# Patient Record
Sex: FEMALE | Race: WHITE
[De-identification: names, ages, dates, MRNs, and addresses within clinical notes are randomized per-mention and may not be internally consistent; named-entity substitution may affect disease eponyms.]

---

## 2019-03-01 ENCOUNTER — HOSPITAL ENCOUNTER (OUTPATIENT)
Dept: HOSPITAL 61 - PCVCCLINIC | Age: 79
Discharge: HOME | End: 2019-03-01
Attending: INTERNAL MEDICINE
Payer: MEDICARE

## 2019-03-01 DIAGNOSIS — Z88.8: ICD-10-CM

## 2019-03-01 DIAGNOSIS — I48.1: Primary | ICD-10-CM

## 2019-03-01 DIAGNOSIS — K21.9: ICD-10-CM

## 2019-03-01 DIAGNOSIS — Z79.899: ICD-10-CM

## 2019-03-01 DIAGNOSIS — E78.5: ICD-10-CM

## 2019-03-01 DIAGNOSIS — I50.32: ICD-10-CM

## 2019-03-01 DIAGNOSIS — I11.0: ICD-10-CM

## 2019-03-01 DIAGNOSIS — R94.31: ICD-10-CM

## 2019-03-01 DIAGNOSIS — M19.90: ICD-10-CM

## 2019-03-01 PROCEDURE — 93005 ELECTROCARDIOGRAM TRACING: CPT

## 2019-03-01 PROCEDURE — 36415 COLL VENOUS BLD VENIPUNCTURE: CPT

## 2019-03-01 PROCEDURE — G0463 HOSPITAL OUTPT CLINIC VISIT: HCPCS

## 2019-03-01 PROCEDURE — 80061 LIPID PANEL: CPT

## 2019-04-12 ENCOUNTER — HOSPITAL ENCOUNTER (OUTPATIENT)
Dept: HOSPITAL 61 - PCVCCLINIC | Age: 79
Discharge: HOME | End: 2019-04-12
Attending: INTERNAL MEDICINE
Payer: MEDICARE

## 2019-04-12 DIAGNOSIS — Z88.2: ICD-10-CM

## 2019-04-12 DIAGNOSIS — Z88.1: ICD-10-CM

## 2019-04-12 DIAGNOSIS — I48.1: Primary | ICD-10-CM

## 2019-04-12 DIAGNOSIS — I11.0: ICD-10-CM

## 2019-04-12 DIAGNOSIS — Z88.8: ICD-10-CM

## 2019-04-12 DIAGNOSIS — E78.5: ICD-10-CM

## 2019-04-12 DIAGNOSIS — I50.32: ICD-10-CM

## 2019-04-12 PROCEDURE — 93005 ELECTROCARDIOGRAM TRACING: CPT

## 2019-04-12 PROCEDURE — G0463 HOSPITAL OUTPT CLINIC VISIT: HCPCS

## 2019-06-12 ENCOUNTER — HOSPITAL ENCOUNTER (OUTPATIENT)
Dept: HOSPITAL 61 - PCVCIMAG | Age: 79
Discharge: HOME | End: 2019-06-12
Attending: INTERNAL MEDICINE
Payer: MEDICARE

## 2019-06-12 DIAGNOSIS — I48.1: ICD-10-CM

## 2019-06-12 DIAGNOSIS — I08.1: Primary | ICD-10-CM

## 2019-06-12 DIAGNOSIS — R00.2: ICD-10-CM

## 2019-06-12 DIAGNOSIS — E78.5: ICD-10-CM

## 2019-06-12 DIAGNOSIS — I10: ICD-10-CM

## 2019-06-12 PROCEDURE — 93306 TTE W/DOPPLER COMPLETE: CPT

## 2019-06-12 NOTE — PCVCIMAG
--------------- APPROVED REPORT --------------





Study performed:  2019 15:11:57



EXAM: Comprehensive 2D, Doppler, and color-flow 

Echocardiogram

Patient Location: Echo lab

Status:  routine



BSA:         1.88

HR: 107 bpmBP:          160/100 mmHg

Rhythm: Atrial Fibrillation



Other Information 

Study Quality: Technically Difficult



Risk Factors: 

Cardiac Risk Factors:  HTN, Hyperlipidemia



Indications

Atrial Fibrillation

Hypertension/HDD

Hyperlipidemia



2D Dimensions

IVSd:  12.66 (7-11mm)LVOT Diam:  19.69 (18-24mm) 

LVDd:  46.06 mm

PWd:  9.07 (7-11mm)Ascending Ao:  32.98 (22-36mm)

LVDs:  27.88 (25-40mm)

Left Atrium:  57.45 (27-40mm)

Aortic Root:  30.09 mm

LV Single Plane 4CH:  45.59 %



Volumes

Left Atrial Volume (Systole)

Single Plane 4CH:  79.13 mLSingle Plane 2CH:  54.87 mL

LA ESV Index:  44.00 mL/m2



Aortic Valve

AoV Peak Tj.:  1.55 m/s

AO Peak Gr.:  10.82 mmHgLVOT Max P.16 mmHg

LVOT Max V:  1.13 m/s

SHAHEED Vmax: 2.22 cm2



Mitral Valve

E/A Ratio:  1.0

MV E Max Tj.:  1.40 m/s

MV A Tj.:  1.43 m/s



Pulmonary Valve

PV Peak Gr.:  2.52 mmHg



Tricuspid Valve

TR Peak Tj.:  2.37 m/s

TR Peak Gr.:  22.63 mmHg



Left Ventricle

The left ventricle is normal size. There is normal LV segmental wall 

motion. There is normal left ventricular wall thickness. Left 

ventricular systolic function is normal. The left ventricular 

ejection fraction is within the normal range. LVEF is 55-60%. This 

study is not technically sufficient to allow evaluation of the LV 

diastolic function due to atrial fibrillation.



Right Ventricle

The right ventricle is normal size. The right ventricular systolic 

function is normal.



Atria

Left atrium is moderately dilated. Right atrium is moderately 

dilated.



Aortic Valve

The aortic valve is normal in structure. No aortic regurgitation is 

present. There is no aortic valvular stenosis.



Mitral Valve

The mitral valve is normal in structure. Mild mitral regurgitation. 

No evidence of mitral valve stenosis.



Tricuspid Valve

The tricuspid valve is normal in structure. Moderate tricuspid 

regurgitation. Pulmonary artery pressuer is 30mmHg.



Pulmonic Valve

The pulmonary valve is normal in structure. There is no pulmonic 

valvular regurgitation.



Great Vessels

The aortic root is normal in size. IVC is normal in size and 

collapses >50% with inspiration.



Pericardium

There is no pericardial effusion.



<Conclusion>

Left ventricular systolic function is normal.

There is normal LV segmental wall motion.

LVEF is 55-60%.

Both atria are moderately dilated.

The aortic valve is normal in structure. No aortic regurgitation or 

stenosis

The mitral valve is normal in structure. Mild mitral 

regurgitation.

Moderate tricuspid regurgitation. Pulmonary artery pressuer of 

30mmHg.

There is no pericardial effusion.

## 2019-06-21 ENCOUNTER — HOSPITAL ENCOUNTER (OUTPATIENT)
Dept: HOSPITAL 61 - PCVCCLINIC | Age: 79
Discharge: HOME | End: 2019-06-21
Attending: INTERNAL MEDICINE
Payer: MEDICARE

## 2019-06-21 DIAGNOSIS — I50.32: ICD-10-CM

## 2019-06-21 DIAGNOSIS — M19.90: ICD-10-CM

## 2019-06-21 DIAGNOSIS — K21.9: ICD-10-CM

## 2019-06-21 DIAGNOSIS — I11.0: ICD-10-CM

## 2019-06-21 DIAGNOSIS — Z79.899: ICD-10-CM

## 2019-06-21 DIAGNOSIS — I48.1: Primary | ICD-10-CM

## 2019-06-21 DIAGNOSIS — E78.5: ICD-10-CM

## 2019-06-21 PROCEDURE — G0463 HOSPITAL OUTPT CLINIC VISIT: HCPCS

## 2019-06-21 PROCEDURE — 93005 ELECTROCARDIOGRAM TRACING: CPT

## 2019-06-21 PROCEDURE — 36415 COLL VENOUS BLD VENIPUNCTURE: CPT

## 2019-06-21 PROCEDURE — 80061 LIPID PANEL: CPT

## 2019-06-28 ENCOUNTER — HOSPITAL ENCOUNTER (OUTPATIENT)
Dept: HOSPITAL 35 - HYPER | Age: 79
End: 2019-06-28
Attending: PREVENTIVE MEDICINE
Payer: COMMERCIAL

## 2019-06-28 DIAGNOSIS — R60.9: ICD-10-CM

## 2019-06-28 DIAGNOSIS — L97.811: ICD-10-CM

## 2019-06-28 DIAGNOSIS — Z86.12: ICD-10-CM

## 2019-06-28 DIAGNOSIS — E78.5: ICD-10-CM

## 2019-06-28 DIAGNOSIS — L97.821: Primary | ICD-10-CM

## 2019-06-28 DIAGNOSIS — M19.90: ICD-10-CM

## 2019-06-28 DIAGNOSIS — Z79.01: ICD-10-CM

## 2019-06-28 DIAGNOSIS — K21.9: ICD-10-CM

## 2019-06-28 DIAGNOSIS — I48.1: ICD-10-CM

## 2019-06-28 DIAGNOSIS — Z90.710: ICD-10-CM

## 2019-06-28 DIAGNOSIS — I50.32: ICD-10-CM

## 2019-06-28 DIAGNOSIS — I11.0: ICD-10-CM

## 2019-07-22 ENCOUNTER — HOSPITAL ENCOUNTER (OUTPATIENT)
Dept: HOSPITAL 35 - HYPER | Age: 79
End: 2019-07-22
Attending: PREVENTIVE MEDICINE
Payer: COMMERCIAL

## 2019-07-22 DIAGNOSIS — R60.9: ICD-10-CM

## 2019-07-22 DIAGNOSIS — L97.811: ICD-10-CM

## 2019-07-22 DIAGNOSIS — M19.90: ICD-10-CM

## 2019-07-22 DIAGNOSIS — I48.1: ICD-10-CM

## 2019-07-22 DIAGNOSIS — I11.0: ICD-10-CM

## 2019-07-22 DIAGNOSIS — I48.0: ICD-10-CM

## 2019-07-22 DIAGNOSIS — K21.9: ICD-10-CM

## 2019-07-22 DIAGNOSIS — L97.821: Primary | ICD-10-CM

## 2019-07-22 DIAGNOSIS — Z79.01: ICD-10-CM

## 2019-07-22 DIAGNOSIS — E78.5: ICD-10-CM

## 2019-07-22 DIAGNOSIS — I50.32: ICD-10-CM

## 2019-08-20 ENCOUNTER — HOSPITAL ENCOUNTER (OUTPATIENT)
Dept: HOSPITAL 35 - HYPER | Age: 79
End: 2019-08-20
Attending: PREVENTIVE MEDICINE
Payer: COMMERCIAL

## 2019-08-20 DIAGNOSIS — R60.9: ICD-10-CM

## 2019-08-20 DIAGNOSIS — Z79.01: ICD-10-CM

## 2019-08-20 DIAGNOSIS — L97.821: ICD-10-CM

## 2019-08-20 DIAGNOSIS — K21.9: ICD-10-CM

## 2019-08-20 DIAGNOSIS — I50.32: ICD-10-CM

## 2019-08-20 DIAGNOSIS — E78.5: ICD-10-CM

## 2019-08-20 DIAGNOSIS — I48.1: ICD-10-CM

## 2019-08-20 DIAGNOSIS — M19.90: ICD-10-CM

## 2019-08-20 DIAGNOSIS — L97.811: Primary | ICD-10-CM

## 2019-08-20 DIAGNOSIS — I11.0: ICD-10-CM

## 2019-09-04 ENCOUNTER — HOSPITAL ENCOUNTER (OUTPATIENT)
Dept: HOSPITAL 35 - HYPER | Age: 79
End: 2019-09-04
Attending: PREVENTIVE MEDICINE
Payer: COMMERCIAL
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
## 2019-09-04 DIAGNOSIS — I11.0: ICD-10-CM

## 2019-09-04 DIAGNOSIS — K21.9: ICD-10-CM

## 2019-09-04 DIAGNOSIS — I50.32: ICD-10-CM

## 2019-09-04 DIAGNOSIS — M19.90: ICD-10-CM

## 2019-09-04 DIAGNOSIS — R60.9: ICD-10-CM

## 2019-09-04 DIAGNOSIS — E78.5: ICD-10-CM

## 2019-09-04 DIAGNOSIS — Z79.01: ICD-10-CM

## 2019-09-04 DIAGNOSIS — I48.1: ICD-10-CM

## 2019-09-04 DIAGNOSIS — L97.811: Primary | ICD-10-CM

## 2019-09-04 DIAGNOSIS — S80.822S: ICD-10-CM

## 2019-09-17 ENCOUNTER — HOSPITAL ENCOUNTER (OUTPATIENT)
Dept: HOSPITAL 35 - HYPER | Age: 79
End: 2019-09-17
Attending: PREVENTIVE MEDICINE
Payer: COMMERCIAL

## 2019-09-17 DIAGNOSIS — I50.32: ICD-10-CM

## 2019-09-17 DIAGNOSIS — E78.5: ICD-10-CM

## 2019-09-17 DIAGNOSIS — S80.822D: ICD-10-CM

## 2019-09-17 DIAGNOSIS — Q82.0: ICD-10-CM

## 2019-09-17 DIAGNOSIS — L97.811: Primary | ICD-10-CM

## 2019-09-17 DIAGNOSIS — L29.8: ICD-10-CM

## 2019-09-17 DIAGNOSIS — I11.0: ICD-10-CM

## 2019-09-17 DIAGNOSIS — Z79.01: ICD-10-CM
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
## 2019-09-17 DIAGNOSIS — K21.9: ICD-10-CM

## 2019-09-17 DIAGNOSIS — M19.90: ICD-10-CM

## 2019-09-17 DIAGNOSIS — I48.1: ICD-10-CM

## 2019-10-07 ENCOUNTER — HOSPITAL ENCOUNTER (OUTPATIENT)
Dept: HOSPITAL 35 - HYPER | Age: 79
End: 2019-10-07
Attending: PREVENTIVE MEDICINE
Payer: COMMERCIAL

## 2019-10-07 DIAGNOSIS — I11.0: ICD-10-CM

## 2019-10-07 DIAGNOSIS — L97.811: Primary | ICD-10-CM

## 2019-10-07 DIAGNOSIS — I50.32: ICD-10-CM
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
## 2019-10-07 DIAGNOSIS — Z79.01: ICD-10-CM

## 2019-10-07 DIAGNOSIS — I89.0: ICD-10-CM

## 2019-10-07 DIAGNOSIS — K21.9: ICD-10-CM

## 2019-10-07 DIAGNOSIS — E78.5: ICD-10-CM

## 2019-10-07 DIAGNOSIS — L29.8: ICD-10-CM

## 2019-10-07 DIAGNOSIS — S80.822D: ICD-10-CM

## 2019-10-07 DIAGNOSIS — M19.90: ICD-10-CM

## 2019-10-21 ENCOUNTER — HOSPITAL ENCOUNTER (OUTPATIENT)
Dept: HOSPITAL 35 - HYPER | Age: 79
End: 2019-10-21
Attending: PREVENTIVE MEDICINE
Payer: COMMERCIAL

## 2019-10-21 DIAGNOSIS — L29.8: ICD-10-CM

## 2019-10-21 DIAGNOSIS — I50.32: ICD-10-CM

## 2019-10-21 DIAGNOSIS — E78.5: ICD-10-CM

## 2019-10-21 DIAGNOSIS — S80.822D: ICD-10-CM

## 2019-10-21 DIAGNOSIS — I48.0: ICD-10-CM

## 2019-10-21 DIAGNOSIS — R60.9: ICD-10-CM

## 2019-10-21 DIAGNOSIS — I89.0: ICD-10-CM

## 2019-10-21 DIAGNOSIS — M19.90: ICD-10-CM

## 2019-10-21 DIAGNOSIS — I11.0: ICD-10-CM

## 2019-10-21 DIAGNOSIS — K21.9: ICD-10-CM

## 2019-10-21 DIAGNOSIS — L97.811: Primary | ICD-10-CM

## 2019-10-21 DIAGNOSIS — Z79.01: ICD-10-CM
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
## 2019-11-04 ENCOUNTER — HOSPITAL ENCOUNTER (OUTPATIENT)
Dept: HOSPITAL 35 - HYPER | Age: 79
End: 2019-11-04
Attending: EMERGENCY MEDICINE
Payer: COMMERCIAL
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
## 2019-11-04 DIAGNOSIS — L97.811: Primary | ICD-10-CM

## 2019-11-04 DIAGNOSIS — K21.9: ICD-10-CM

## 2019-11-04 DIAGNOSIS — I50.32: ICD-10-CM

## 2019-11-04 DIAGNOSIS — I48.19: ICD-10-CM

## 2019-11-04 DIAGNOSIS — L29.8: ICD-10-CM

## 2019-11-04 DIAGNOSIS — I11.0: ICD-10-CM

## 2019-11-04 DIAGNOSIS — M19.90: ICD-10-CM

## 2019-11-04 DIAGNOSIS — S80.822D: ICD-10-CM

## 2019-11-04 DIAGNOSIS — E78.5: ICD-10-CM

## 2019-11-04 DIAGNOSIS — L03.115: ICD-10-CM

## 2019-11-04 DIAGNOSIS — Q82.0: ICD-10-CM

## 2019-11-04 DIAGNOSIS — Z79.01: ICD-10-CM

## 2019-11-18 ENCOUNTER — HOSPITAL ENCOUNTER (OUTPATIENT)
Dept: HOSPITAL 35 - HYPER | Age: 79
End: 2019-11-18
Attending: EMERGENCY MEDICINE
Payer: COMMERCIAL

## 2019-11-18 DIAGNOSIS — S80.811D: ICD-10-CM

## 2019-11-18 DIAGNOSIS — I89.0: ICD-10-CM

## 2019-11-18 DIAGNOSIS — L97.811: Primary | ICD-10-CM

## 2019-11-18 DIAGNOSIS — M19.90: ICD-10-CM

## 2019-11-18 DIAGNOSIS — R60.9: ICD-10-CM

## 2019-11-18 DIAGNOSIS — L29.8: ICD-10-CM

## 2019-11-18 DIAGNOSIS — I50.32: ICD-10-CM
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
## 2019-11-18 DIAGNOSIS — I11.0: ICD-10-CM

## 2019-11-18 DIAGNOSIS — L03.115: ICD-10-CM

## 2019-11-18 DIAGNOSIS — Z79.01: ICD-10-CM

## 2019-11-18 DIAGNOSIS — E78.5: ICD-10-CM

## 2019-11-18 DIAGNOSIS — K21.9: ICD-10-CM

## 2019-11-18 DIAGNOSIS — I48.11: ICD-10-CM

## 2019-12-03 ENCOUNTER — HOSPITAL ENCOUNTER (OUTPATIENT)
Dept: HOSPITAL 35 - HYPER | Age: 79
End: 2019-12-03
Attending: PREVENTIVE MEDICINE
Payer: COMMERCIAL

## 2019-12-03 DIAGNOSIS — S80.822D: ICD-10-CM

## 2019-12-03 DIAGNOSIS — I50.32: ICD-10-CM

## 2019-12-03 DIAGNOSIS — K21.9: ICD-10-CM

## 2019-12-03 DIAGNOSIS — L29.8: ICD-10-CM
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
## 2019-12-03 DIAGNOSIS — L97.811: Primary | ICD-10-CM

## 2019-12-03 DIAGNOSIS — I48.91: ICD-10-CM

## 2019-12-03 DIAGNOSIS — Z79.01: ICD-10-CM

## 2019-12-03 DIAGNOSIS — M19.90: ICD-10-CM

## 2019-12-03 DIAGNOSIS — I48.0: ICD-10-CM

## 2019-12-03 DIAGNOSIS — E78.5: ICD-10-CM

## 2019-12-03 DIAGNOSIS — I11.0: ICD-10-CM

## 2019-12-03 DIAGNOSIS — Q82.0: ICD-10-CM

## 2019-12-03 DIAGNOSIS — L03.115: ICD-10-CM

## 2020-01-14 ENCOUNTER — HOSPITAL ENCOUNTER (OUTPATIENT)
Dept: HOSPITAL 35 - HYPER | Age: 80
End: 2020-01-14
Attending: PREVENTIVE MEDICINE
Payer: COMMERCIAL

## 2020-01-14 DIAGNOSIS — L29.8: ICD-10-CM

## 2020-01-14 DIAGNOSIS — I48.0: ICD-10-CM

## 2020-01-14 DIAGNOSIS — K21.9: ICD-10-CM

## 2020-01-14 DIAGNOSIS — E78.5: ICD-10-CM

## 2020-01-14 DIAGNOSIS — I50.32: ICD-10-CM

## 2020-01-14 DIAGNOSIS — L97.811: ICD-10-CM

## 2020-01-14 DIAGNOSIS — I11.0: ICD-10-CM

## 2020-01-14 DIAGNOSIS — L97.421: Primary | ICD-10-CM

## 2020-01-14 DIAGNOSIS — M19.90: ICD-10-CM

## 2020-01-14 DIAGNOSIS — Z86.12: ICD-10-CM

## 2020-01-14 DIAGNOSIS — Q82.0: ICD-10-CM

## 2020-01-14 DIAGNOSIS — L03.115: ICD-10-CM

## 2020-01-14 DIAGNOSIS — L84: ICD-10-CM

## 2020-01-28 ENCOUNTER — HOSPITAL ENCOUNTER (OUTPATIENT)
Dept: HOSPITAL 35 - HYPER | Age: 80
End: 2020-01-28
Attending: PREVENTIVE MEDICINE
Payer: COMMERCIAL

## 2020-01-28 DIAGNOSIS — I50.32: ICD-10-CM

## 2020-01-28 DIAGNOSIS — L97.421: ICD-10-CM

## 2020-01-28 DIAGNOSIS — K21.9: ICD-10-CM

## 2020-01-28 DIAGNOSIS — I11.0: ICD-10-CM

## 2020-01-28 DIAGNOSIS — L29.8: ICD-10-CM

## 2020-01-28 DIAGNOSIS — E78.5: ICD-10-CM

## 2020-01-28 DIAGNOSIS — L97.521: Primary | ICD-10-CM

## 2020-01-28 DIAGNOSIS — Q82.0: ICD-10-CM

## 2020-01-28 DIAGNOSIS — L97.811: ICD-10-CM

## 2020-01-28 DIAGNOSIS — Z79.01: ICD-10-CM

## 2020-01-28 DIAGNOSIS — L84: ICD-10-CM

## 2020-01-28 DIAGNOSIS — I48.19: ICD-10-CM

## 2020-02-18 ENCOUNTER — HOSPITAL ENCOUNTER (OUTPATIENT)
Dept: HOSPITAL 35 - HYPER | Age: 80
End: 2020-02-18
Attending: PREVENTIVE MEDICINE
Payer: COMMERCIAL

## 2020-02-18 DIAGNOSIS — I50.32: ICD-10-CM

## 2020-02-18 DIAGNOSIS — L84: ICD-10-CM

## 2020-02-18 DIAGNOSIS — K21.9: ICD-10-CM

## 2020-02-18 DIAGNOSIS — S80.822D: ICD-10-CM

## 2020-02-18 DIAGNOSIS — L97.421: Primary | ICD-10-CM

## 2020-02-18 DIAGNOSIS — E78.5: ICD-10-CM

## 2020-02-18 DIAGNOSIS — I11.0: ICD-10-CM

## 2020-02-18 DIAGNOSIS — L97.811: ICD-10-CM

## 2020-02-18 DIAGNOSIS — A80.9: ICD-10-CM

## 2020-02-18 DIAGNOSIS — I48.19: ICD-10-CM

## 2020-02-18 DIAGNOSIS — Z79.01: ICD-10-CM
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
## 2020-02-18 DIAGNOSIS — M19.90: ICD-10-CM

## 2020-02-18 DIAGNOSIS — Q82.0: ICD-10-CM

## 2020-02-18 DIAGNOSIS — L03.115: ICD-10-CM

## 2020-02-18 DIAGNOSIS — L29.8: ICD-10-CM

## 2020-03-17 ENCOUNTER — HOSPITAL ENCOUNTER (OUTPATIENT)
Dept: HOSPITAL 35 - HYPER | Age: 80
End: 2020-03-17
Attending: PREVENTIVE MEDICINE
Payer: COMMERCIAL

## 2020-03-17 DIAGNOSIS — L97.811: ICD-10-CM

## 2020-03-17 DIAGNOSIS — Z86.12: ICD-10-CM

## 2020-03-17 DIAGNOSIS — I50.32: ICD-10-CM

## 2020-03-17 DIAGNOSIS — L03.115: ICD-10-CM

## 2020-03-17 DIAGNOSIS — I48.19: ICD-10-CM

## 2020-03-17 DIAGNOSIS — L84: ICD-10-CM

## 2020-03-17 DIAGNOSIS — Q82.0: ICD-10-CM

## 2020-03-17 DIAGNOSIS — K21.9: ICD-10-CM

## 2020-03-17 DIAGNOSIS — Z79.01: ICD-10-CM

## 2020-03-17 DIAGNOSIS — E78.5: ICD-10-CM

## 2020-03-17 DIAGNOSIS — I11.0: ICD-10-CM

## 2020-03-17 DIAGNOSIS — S80.822D: ICD-10-CM

## 2020-03-17 DIAGNOSIS — M19.90: ICD-10-CM

## 2020-03-17 DIAGNOSIS — L97.421: Primary | ICD-10-CM

## 2020-03-17 DIAGNOSIS — L29.8: ICD-10-CM

## 2020-05-19 ENCOUNTER — HOSPITAL ENCOUNTER (OUTPATIENT)
Dept: HOSPITAL 35 - HYPER | Age: 80
End: 2020-05-19
Attending: PREVENTIVE MEDICINE
Payer: COMMERCIAL

## 2020-05-19 DIAGNOSIS — K21.9: ICD-10-CM

## 2020-05-19 DIAGNOSIS — L84: ICD-10-CM

## 2020-05-19 DIAGNOSIS — I48.19: ICD-10-CM

## 2020-05-19 DIAGNOSIS — I50.32: ICD-10-CM

## 2020-05-19 DIAGNOSIS — S80.822D: ICD-10-CM

## 2020-05-19 DIAGNOSIS — E78.5: ICD-10-CM

## 2020-05-19 DIAGNOSIS — R60.9: ICD-10-CM

## 2020-05-19 DIAGNOSIS — Z79.01: ICD-10-CM

## 2020-05-19 DIAGNOSIS — L97.811: ICD-10-CM

## 2020-05-19 DIAGNOSIS — L97.421: Primary | ICD-10-CM

## 2020-05-19 DIAGNOSIS — L03.115: ICD-10-CM

## 2020-05-19 DIAGNOSIS — L29.8: ICD-10-CM

## 2020-05-19 DIAGNOSIS — Z86.12: ICD-10-CM
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
## 2020-05-19 DIAGNOSIS — I89.0: ICD-10-CM

## 2020-05-19 DIAGNOSIS — M19.90: ICD-10-CM

## 2020-05-19 DIAGNOSIS — I11.0: ICD-10-CM

## 2020-06-02 ENCOUNTER — HOSPITAL ENCOUNTER (OUTPATIENT)
Dept: HOSPITAL 35 - HYPER | Age: 80
End: 2020-06-02
Attending: PREVENTIVE MEDICINE
Payer: COMMERCIAL
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
## 2020-06-02 DIAGNOSIS — Z79.01: ICD-10-CM

## 2020-06-02 DIAGNOSIS — I11.0: ICD-10-CM
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
## 2020-06-02 DIAGNOSIS — L29.8: ICD-10-CM
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
## 2020-06-02 DIAGNOSIS — L97.421: ICD-10-CM

## 2020-06-02 DIAGNOSIS — I50.32: ICD-10-CM

## 2020-06-02 DIAGNOSIS — L03.115: ICD-10-CM
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
## 2020-06-02 DIAGNOSIS — Q82.0: ICD-10-CM

## 2020-06-02 DIAGNOSIS — L84: ICD-10-CM

## 2020-06-02 DIAGNOSIS — M19.90: ICD-10-CM

## 2020-06-02 DIAGNOSIS — E78.5: ICD-10-CM

## 2020-06-02 DIAGNOSIS — L97.811: ICD-10-CM

## 2020-06-02 DIAGNOSIS — I48.19: ICD-10-CM

## 2020-06-02 DIAGNOSIS — K21.9: ICD-10-CM

## 2020-06-02 DIAGNOSIS — S80.822A: Primary | ICD-10-CM

## 2020-06-16 ENCOUNTER — HOSPITAL ENCOUNTER (OUTPATIENT)
Dept: HOSPITAL 35 - HYPER | Age: 80
End: 2020-06-16
Attending: PREVENTIVE MEDICINE
Payer: COMMERCIAL

## 2020-06-16 DIAGNOSIS — M19.90: ICD-10-CM

## 2020-06-16 DIAGNOSIS — L03.115: ICD-10-CM

## 2020-06-16 DIAGNOSIS — L84: ICD-10-CM

## 2020-06-16 DIAGNOSIS — S80.822D: ICD-10-CM
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
## 2020-06-16 DIAGNOSIS — L97.811: Primary | ICD-10-CM

## 2020-06-16 DIAGNOSIS — Z79.01: ICD-10-CM

## 2020-06-16 DIAGNOSIS — L97.421: ICD-10-CM

## 2020-06-16 DIAGNOSIS — I11.0: ICD-10-CM

## 2020-06-16 DIAGNOSIS — Z86.12: ICD-10-CM

## 2020-06-16 DIAGNOSIS — Z87.898: ICD-10-CM

## 2020-06-16 DIAGNOSIS — E78.5: ICD-10-CM

## 2020-06-16 DIAGNOSIS — L29.8: ICD-10-CM

## 2020-06-16 DIAGNOSIS — I48.19: ICD-10-CM

## 2020-06-16 DIAGNOSIS — I50.32: ICD-10-CM

## 2020-06-16 DIAGNOSIS — Q82.0: ICD-10-CM

## 2020-06-16 DIAGNOSIS — K21.9: ICD-10-CM

## 2020-06-30 ENCOUNTER — HOSPITAL ENCOUNTER (OUTPATIENT)
Dept: HOSPITAL 35 - HYPER | Age: 80
End: 2020-06-30
Attending: PREVENTIVE MEDICINE
Payer: COMMERCIAL

## 2020-06-30 DIAGNOSIS — Z79.01: ICD-10-CM

## 2020-06-30 DIAGNOSIS — I50.32: ICD-10-CM

## 2020-06-30 DIAGNOSIS — S80.822D: ICD-10-CM

## 2020-06-30 DIAGNOSIS — E78.5: ICD-10-CM

## 2020-06-30 DIAGNOSIS — Q82.0: ICD-10-CM

## 2020-06-30 DIAGNOSIS — M19.90: ICD-10-CM

## 2020-06-30 DIAGNOSIS — I48.19: ICD-10-CM

## 2020-06-30 DIAGNOSIS — L84: ICD-10-CM

## 2020-06-30 DIAGNOSIS — L29.8: ICD-10-CM

## 2020-06-30 DIAGNOSIS — L97.421: Primary | ICD-10-CM

## 2020-06-30 DIAGNOSIS — K21.9: ICD-10-CM
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
## 2020-06-30 DIAGNOSIS — L97.811: ICD-10-CM

## 2020-06-30 DIAGNOSIS — Z86.12: ICD-10-CM

## 2020-06-30 DIAGNOSIS — I11.0: ICD-10-CM

## 2020-06-30 DIAGNOSIS — L03.115: ICD-10-CM

## 2020-07-27 ENCOUNTER — HOSPITAL ENCOUNTER (OUTPATIENT)
Dept: HOSPITAL 35 - HYPER | Age: 80
End: 2020-07-27
Attending: PREVENTIVE MEDICINE
Payer: COMMERCIAL

## 2020-07-27 DIAGNOSIS — M19.90: ICD-10-CM

## 2020-07-27 DIAGNOSIS — L97.421: ICD-10-CM

## 2020-07-27 DIAGNOSIS — Q82.0: ICD-10-CM

## 2020-07-27 DIAGNOSIS — E78.5: ICD-10-CM
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
## 2020-07-27 DIAGNOSIS — I11.0: ICD-10-CM

## 2020-07-27 DIAGNOSIS — I48.19: ICD-10-CM

## 2020-07-27 DIAGNOSIS — L29.8: ICD-10-CM

## 2020-07-27 DIAGNOSIS — S80.822D: ICD-10-CM

## 2020-07-27 DIAGNOSIS — Z86.12: ICD-10-CM

## 2020-07-27 DIAGNOSIS — L97.811: Primary | ICD-10-CM

## 2020-07-27 DIAGNOSIS — Z79.01: ICD-10-CM

## 2020-07-27 DIAGNOSIS — L84: ICD-10-CM

## 2020-07-27 DIAGNOSIS — L03.115: ICD-10-CM

## 2020-07-27 DIAGNOSIS — K21.9: ICD-10-CM

## 2020-07-27 DIAGNOSIS — I50.32: ICD-10-CM

## 2020-07-31 ENCOUNTER — HOSPITAL ENCOUNTER (OUTPATIENT)
Dept: HOSPITAL 35 - SJCVC | Age: 80
End: 2020-07-31
Attending: INTERNAL MEDICINE
Payer: COMMERCIAL

## 2020-07-31 DIAGNOSIS — I50.32: ICD-10-CM

## 2020-07-31 DIAGNOSIS — I11.0: ICD-10-CM

## 2020-07-31 DIAGNOSIS — I48.19: ICD-10-CM

## 2020-07-31 DIAGNOSIS — E78.5: ICD-10-CM

## 2020-07-31 DIAGNOSIS — I48.91: Primary | ICD-10-CM

## 2020-08-18 ENCOUNTER — HOSPITAL ENCOUNTER (OUTPATIENT)
Dept: HOSPITAL 35 - HYPER | Age: 80
End: 2020-08-18
Attending: PREVENTIVE MEDICINE
Payer: COMMERCIAL

## 2020-08-18 DIAGNOSIS — L30.9: ICD-10-CM

## 2020-08-18 DIAGNOSIS — M19.90: ICD-10-CM
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
## 2020-08-18 DIAGNOSIS — I50.32: ICD-10-CM

## 2020-08-18 DIAGNOSIS — K21.9: ICD-10-CM

## 2020-08-18 DIAGNOSIS — L97.821: Primary | ICD-10-CM

## 2020-08-18 DIAGNOSIS — Z79.01: ICD-10-CM

## 2020-08-18 DIAGNOSIS — Z86.12: ICD-10-CM

## 2020-08-18 DIAGNOSIS — S80.822D: ICD-10-CM

## 2020-08-18 DIAGNOSIS — L97.421: ICD-10-CM

## 2020-08-18 DIAGNOSIS — Q82.0: ICD-10-CM

## 2020-08-18 DIAGNOSIS — I11.0: ICD-10-CM

## 2020-08-18 DIAGNOSIS — L29.8: ICD-10-CM

## 2020-08-18 DIAGNOSIS — L84: ICD-10-CM

## 2020-08-18 DIAGNOSIS — L03.115: ICD-10-CM

## 2020-08-18 DIAGNOSIS — E78.5: ICD-10-CM

## 2020-08-18 DIAGNOSIS — I48.19: ICD-10-CM

## 2020-09-09 ENCOUNTER — HOSPITAL ENCOUNTER (OUTPATIENT)
Dept: HOSPITAL 35 - HYPER | Age: 80
End: 2020-09-09
Attending: PREVENTIVE MEDICINE
Payer: COMMERCIAL
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
## 2020-09-09 DIAGNOSIS — L97.821: ICD-10-CM

## 2020-09-09 DIAGNOSIS — S80.822D: ICD-10-CM

## 2020-09-09 DIAGNOSIS — L97.421: ICD-10-CM

## 2020-09-09 DIAGNOSIS — I48.19: ICD-10-CM

## 2020-09-09 DIAGNOSIS — K21.9: ICD-10-CM

## 2020-09-09 DIAGNOSIS — Z79.01: ICD-10-CM

## 2020-09-09 DIAGNOSIS — L29.8: ICD-10-CM

## 2020-09-09 DIAGNOSIS — Z86.12: ICD-10-CM

## 2020-09-09 DIAGNOSIS — I11.0: ICD-10-CM

## 2020-09-09 DIAGNOSIS — L97.811: Primary | ICD-10-CM

## 2020-09-09 DIAGNOSIS — L30.9: ICD-10-CM

## 2020-09-09 DIAGNOSIS — L84: ICD-10-CM

## 2020-09-09 DIAGNOSIS — L03.115: ICD-10-CM

## 2020-09-09 DIAGNOSIS — M19.90: ICD-10-CM

## 2020-09-09 DIAGNOSIS — I50.32: ICD-10-CM

## 2020-09-09 DIAGNOSIS — Q82.0: ICD-10-CM

## 2020-09-09 DIAGNOSIS — E78.5: ICD-10-CM

## 2020-10-28 ENCOUNTER — HOSPITAL ENCOUNTER (OUTPATIENT)
Dept: HOSPITAL 35 - HYPER | Age: 80
End: 2020-10-28
Attending: PREVENTIVE MEDICINE
Payer: COMMERCIAL

## 2020-10-28 DIAGNOSIS — L29.8: ICD-10-CM

## 2020-10-28 DIAGNOSIS — L97.821: ICD-10-CM

## 2020-10-28 DIAGNOSIS — I11.0: ICD-10-CM

## 2020-10-28 DIAGNOSIS — K21.9: ICD-10-CM

## 2020-10-28 DIAGNOSIS — Z79.01: ICD-10-CM

## 2020-10-28 DIAGNOSIS — Z86.12: ICD-10-CM

## 2020-10-28 DIAGNOSIS — M19.90: ICD-10-CM

## 2020-10-28 DIAGNOSIS — L97.811: ICD-10-CM
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
## 2020-10-28 DIAGNOSIS — L03.115: ICD-10-CM

## 2020-10-28 DIAGNOSIS — S80.822D: ICD-10-CM

## 2020-10-28 DIAGNOSIS — I48.19: ICD-10-CM

## 2020-10-28 DIAGNOSIS — I89.0: Primary | ICD-10-CM

## 2020-10-28 DIAGNOSIS — L97.421: ICD-10-CM

## 2020-10-28 DIAGNOSIS — L30.9: ICD-10-CM

## 2020-10-28 DIAGNOSIS — E78.5: ICD-10-CM

## 2020-10-28 DIAGNOSIS — L84: ICD-10-CM

## 2020-10-28 DIAGNOSIS — I50.32: ICD-10-CM

## 2020-10-28 DIAGNOSIS — E66.9: ICD-10-CM

## 2020-11-18 ENCOUNTER — HOSPITAL ENCOUNTER (OUTPATIENT)
Dept: HOSPITAL 35 - HYPER | Age: 80
End: 2020-11-18
Attending: PREVENTIVE MEDICINE
Payer: COMMERCIAL

## 2020-11-18 DIAGNOSIS — S80.822D: ICD-10-CM

## 2020-11-18 DIAGNOSIS — L84: ICD-10-CM

## 2020-11-18 DIAGNOSIS — Q82.0: ICD-10-CM

## 2020-11-18 DIAGNOSIS — L29.8: ICD-10-CM

## 2020-11-18 DIAGNOSIS — I50.32: ICD-10-CM

## 2020-11-18 DIAGNOSIS — L30.9: ICD-10-CM
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
## 2020-11-18 DIAGNOSIS — L97.821: ICD-10-CM

## 2020-11-18 DIAGNOSIS — Z86.12: ICD-10-CM

## 2020-11-18 DIAGNOSIS — L03.115: ICD-10-CM

## 2020-11-18 DIAGNOSIS — L97.811: ICD-10-CM

## 2020-11-18 DIAGNOSIS — L97.421: ICD-10-CM

## 2020-11-18 DIAGNOSIS — M19.90: ICD-10-CM

## 2020-11-18 DIAGNOSIS — I48.19: ICD-10-CM

## 2020-11-18 DIAGNOSIS — Z79.01: ICD-10-CM

## 2020-11-18 DIAGNOSIS — K21.9: ICD-10-CM

## 2020-11-18 DIAGNOSIS — I89.0: Primary | ICD-10-CM

## 2020-11-18 DIAGNOSIS — I11.0: ICD-10-CM

## 2020-11-18 DIAGNOSIS — E78.5: ICD-10-CM

## 2020-12-09 ENCOUNTER — HOSPITAL ENCOUNTER (OUTPATIENT)
Dept: HOSPITAL 35 - HYPER | Age: 80
End: 2020-12-09
Attending: PREVENTIVE MEDICINE
Payer: COMMERCIAL

## 2020-12-09 DIAGNOSIS — L03.115: ICD-10-CM

## 2020-12-09 DIAGNOSIS — Z79.01: ICD-10-CM

## 2020-12-09 DIAGNOSIS — L97.821: ICD-10-CM

## 2020-12-09 DIAGNOSIS — I48.19: ICD-10-CM
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
## 2020-12-09 DIAGNOSIS — L84: ICD-10-CM

## 2020-12-09 DIAGNOSIS — L97.811: ICD-10-CM

## 2020-12-09 DIAGNOSIS — I50.32: ICD-10-CM

## 2020-12-09 DIAGNOSIS — E78.5: ICD-10-CM

## 2020-12-09 DIAGNOSIS — L29.8: ICD-10-CM

## 2020-12-09 DIAGNOSIS — L30.9: ICD-10-CM

## 2020-12-09 DIAGNOSIS — I11.0: ICD-10-CM

## 2020-12-09 DIAGNOSIS — I89.0: Primary | ICD-10-CM

## 2020-12-09 DIAGNOSIS — L97.421: ICD-10-CM

## 2020-12-09 DIAGNOSIS — M19.90: ICD-10-CM

## 2020-12-09 DIAGNOSIS — K21.9: ICD-10-CM

## 2020-12-09 DIAGNOSIS — Z86.12: ICD-10-CM

## 2020-12-09 DIAGNOSIS — S80.822D: ICD-10-CM

## 2020-12-30 ENCOUNTER — HOSPITAL ENCOUNTER (OUTPATIENT)
Dept: HOSPITAL 35 - HYPER | Age: 80
End: 2020-12-30
Attending: PREVENTIVE MEDICINE
Payer: COMMERCIAL

## 2020-12-30 DIAGNOSIS — Z79.01: ICD-10-CM

## 2020-12-30 DIAGNOSIS — L84: ICD-10-CM

## 2020-12-30 DIAGNOSIS — I50.32: ICD-10-CM

## 2020-12-30 DIAGNOSIS — L30.9: ICD-10-CM

## 2020-12-30 DIAGNOSIS — L29.8: ICD-10-CM

## 2020-12-30 DIAGNOSIS — Q82.0: ICD-10-CM

## 2020-12-30 DIAGNOSIS — L97.221: Primary | ICD-10-CM

## 2020-12-30 DIAGNOSIS — I48.19: ICD-10-CM

## 2021-01-29 ENCOUNTER — HOSPITAL ENCOUNTER (OUTPATIENT)
Dept: HOSPITAL 35 - SJCVCIMAG | Age: 81
End: 2021-01-29
Attending: INTERNAL MEDICINE
Payer: COMMERCIAL

## 2021-01-29 DIAGNOSIS — Z79.899: ICD-10-CM

## 2021-01-29 DIAGNOSIS — I89.0: ICD-10-CM

## 2021-01-29 DIAGNOSIS — Z90.710: ICD-10-CM

## 2021-01-29 DIAGNOSIS — Z82.49: ICD-10-CM

## 2021-01-29 DIAGNOSIS — I08.1: Primary | ICD-10-CM

## 2021-01-29 DIAGNOSIS — R94.31: ICD-10-CM

## 2021-01-29 DIAGNOSIS — Z88.0: ICD-10-CM

## 2021-01-29 DIAGNOSIS — I48.19: ICD-10-CM

## 2021-01-29 DIAGNOSIS — Z98.890: ICD-10-CM

## 2021-01-29 DIAGNOSIS — I50.32: ICD-10-CM

## 2021-01-29 DIAGNOSIS — E78.5: ICD-10-CM

## 2021-01-29 DIAGNOSIS — I27.20: ICD-10-CM

## 2021-01-29 DIAGNOSIS — I11.0: ICD-10-CM

## 2021-01-29 DIAGNOSIS — K21.9: ICD-10-CM

## 2021-01-29 DIAGNOSIS — Z88.8: ICD-10-CM

## 2021-01-29 DIAGNOSIS — M19.90: ICD-10-CM

## 2021-02-23 ENCOUNTER — HOSPITAL ENCOUNTER (OUTPATIENT)
Dept: HOSPITAL 35 - HYPER | Age: 81
End: 2021-02-23
Attending: PREVENTIVE MEDICINE
Payer: COMMERCIAL

## 2021-02-23 DIAGNOSIS — L84: ICD-10-CM

## 2021-02-23 DIAGNOSIS — I11.0: ICD-10-CM

## 2021-02-23 DIAGNOSIS — L97.821: Primary | ICD-10-CM

## 2021-02-23 DIAGNOSIS — K21.9: ICD-10-CM

## 2021-02-23 DIAGNOSIS — Q82.0: ICD-10-CM

## 2021-02-23 DIAGNOSIS — E78.5: ICD-10-CM

## 2021-02-23 DIAGNOSIS — I50.32: ICD-10-CM

## 2021-02-23 DIAGNOSIS — I48.0: ICD-10-CM

## 2021-02-23 DIAGNOSIS — I48.19: ICD-10-CM

## 2021-02-23 DIAGNOSIS — Z79.01: ICD-10-CM

## 2021-02-23 DIAGNOSIS — L29.8: ICD-10-CM

## 2021-02-23 DIAGNOSIS — L97.811: ICD-10-CM

## 2021-02-23 DIAGNOSIS — L97.221: ICD-10-CM

## 2021-02-23 DIAGNOSIS — L30.9: ICD-10-CM

## 2021-02-23 DIAGNOSIS — M19.90: ICD-10-CM

## 2021-02-23 DIAGNOSIS — Z79.899: ICD-10-CM

## 2021-02-23 DIAGNOSIS — Z86.12: ICD-10-CM

## 2021-03-10 ENCOUNTER — HOSPITAL ENCOUNTER (OUTPATIENT)
Dept: HOSPITAL 35 - HYPER | Age: 81
End: 2021-03-10
Attending: PREVENTIVE MEDICINE
Payer: COMMERCIAL

## 2021-03-10 DIAGNOSIS — L29.8: ICD-10-CM

## 2021-03-10 DIAGNOSIS — I11.0: ICD-10-CM

## 2021-03-10 DIAGNOSIS — L97.811: ICD-10-CM

## 2021-03-10 DIAGNOSIS — L84: ICD-10-CM

## 2021-03-10 DIAGNOSIS — L97.221: ICD-10-CM

## 2021-03-10 DIAGNOSIS — M19.90: ICD-10-CM

## 2021-03-10 DIAGNOSIS — I50.32: ICD-10-CM

## 2021-03-10 DIAGNOSIS — K21.9: ICD-10-CM

## 2021-03-10 DIAGNOSIS — Q82.0: ICD-10-CM

## 2021-03-10 DIAGNOSIS — Z86.12: ICD-10-CM

## 2021-03-10 DIAGNOSIS — E78.5: ICD-10-CM

## 2021-03-10 DIAGNOSIS — Z79.899: ICD-10-CM

## 2021-03-10 DIAGNOSIS — L30.9: ICD-10-CM

## 2021-03-10 DIAGNOSIS — I48.0: ICD-10-CM

## 2021-03-10 DIAGNOSIS — I48.19: ICD-10-CM

## 2021-03-10 DIAGNOSIS — Z79.01: ICD-10-CM

## 2021-03-10 DIAGNOSIS — L97.821: Primary | ICD-10-CM

## 2021-03-31 ENCOUNTER — HOSPITAL ENCOUNTER (OUTPATIENT)
Dept: HOSPITAL 35 - HYPER | Age: 81
End: 2021-03-31
Attending: PREVENTIVE MEDICINE
Payer: COMMERCIAL

## 2021-03-31 DIAGNOSIS — E78.5: ICD-10-CM

## 2021-03-31 DIAGNOSIS — Z86.12: ICD-10-CM

## 2021-03-31 DIAGNOSIS — Z79.899: ICD-10-CM

## 2021-03-31 DIAGNOSIS — M19.90: ICD-10-CM

## 2021-03-31 DIAGNOSIS — L97.821: Primary | ICD-10-CM

## 2021-03-31 DIAGNOSIS — I50.32: ICD-10-CM

## 2021-03-31 DIAGNOSIS — L97.811: ICD-10-CM

## 2021-03-31 DIAGNOSIS — Q82.0: ICD-10-CM

## 2021-03-31 DIAGNOSIS — I48.0: ICD-10-CM

## 2021-03-31 DIAGNOSIS — Z79.01: ICD-10-CM

## 2021-03-31 DIAGNOSIS — I48.19: ICD-10-CM

## 2021-03-31 DIAGNOSIS — K21.9: ICD-10-CM

## 2021-03-31 DIAGNOSIS — L29.8: ICD-10-CM

## 2021-03-31 DIAGNOSIS — L30.9: ICD-10-CM

## 2021-03-31 DIAGNOSIS — L97.221: ICD-10-CM

## 2021-03-31 DIAGNOSIS — L84: ICD-10-CM

## 2021-03-31 DIAGNOSIS — I11.0: ICD-10-CM

## 2021-05-12 ENCOUNTER — HOSPITAL ENCOUNTER (OUTPATIENT)
Dept: HOSPITAL 35 - HYPER | Age: 81
End: 2021-05-12
Attending: PREVENTIVE MEDICINE
Payer: COMMERCIAL

## 2021-05-12 DIAGNOSIS — Q82.0: ICD-10-CM

## 2021-05-12 DIAGNOSIS — M19.90: ICD-10-CM

## 2021-05-12 DIAGNOSIS — I50.32: ICD-10-CM

## 2021-05-12 DIAGNOSIS — E78.5: ICD-10-CM

## 2021-05-12 DIAGNOSIS — I48.0: ICD-10-CM

## 2021-05-12 DIAGNOSIS — L84: ICD-10-CM

## 2021-05-12 DIAGNOSIS — I48.19: ICD-10-CM

## 2021-05-12 DIAGNOSIS — L29.8: ICD-10-CM

## 2021-05-12 DIAGNOSIS — K21.9: ICD-10-CM

## 2021-05-12 DIAGNOSIS — L97.811: ICD-10-CM

## 2021-05-12 DIAGNOSIS — Z86.12: ICD-10-CM

## 2021-05-12 DIAGNOSIS — L30.9: ICD-10-CM

## 2021-05-12 DIAGNOSIS — L97.821: Primary | ICD-10-CM

## 2021-05-12 DIAGNOSIS — I11.0: ICD-10-CM

## 2021-06-02 ENCOUNTER — HOSPITAL ENCOUNTER (OUTPATIENT)
Dept: HOSPITAL 35 - HYPER | Age: 81
End: 2021-06-02
Attending: PREVENTIVE MEDICINE
Payer: COMMERCIAL

## 2021-06-02 DIAGNOSIS — L29.8: ICD-10-CM

## 2021-06-02 DIAGNOSIS — L97.821: Primary | ICD-10-CM

## 2021-06-02 DIAGNOSIS — I50.32: ICD-10-CM

## 2021-06-02 DIAGNOSIS — Z86.12: ICD-10-CM

## 2021-06-02 DIAGNOSIS — K21.9: ICD-10-CM

## 2021-06-02 DIAGNOSIS — I11.0: ICD-10-CM

## 2021-06-02 DIAGNOSIS — L97.811: ICD-10-CM

## 2021-06-02 DIAGNOSIS — L84: ICD-10-CM

## 2021-06-02 DIAGNOSIS — Q82.0: ICD-10-CM

## 2021-06-02 DIAGNOSIS — I48.19: ICD-10-CM

## 2021-06-02 DIAGNOSIS — E78.5: ICD-10-CM

## 2021-06-02 DIAGNOSIS — L30.9: ICD-10-CM

## 2021-06-02 DIAGNOSIS — M19.90: ICD-10-CM

## 2021-06-23 ENCOUNTER — HOSPITAL ENCOUNTER (OUTPATIENT)
Dept: HOSPITAL 35 - HYPER | Age: 81
End: 2021-06-23
Attending: PREVENTIVE MEDICINE
Payer: COMMERCIAL

## 2021-06-23 DIAGNOSIS — L30.9: ICD-10-CM

## 2021-06-23 DIAGNOSIS — I50.32: ICD-10-CM

## 2021-06-23 DIAGNOSIS — E78.5: ICD-10-CM

## 2021-06-23 DIAGNOSIS — I11.0: ICD-10-CM

## 2021-06-23 DIAGNOSIS — K21.9: ICD-10-CM

## 2021-06-23 DIAGNOSIS — L29.8: ICD-10-CM

## 2021-06-23 DIAGNOSIS — Q82.0: ICD-10-CM

## 2021-06-23 DIAGNOSIS — M19.90: ICD-10-CM

## 2021-06-23 DIAGNOSIS — Z86.12: ICD-10-CM

## 2021-06-23 DIAGNOSIS — I48.19: ICD-10-CM

## 2021-06-23 DIAGNOSIS — L97.811: ICD-10-CM

## 2021-06-23 DIAGNOSIS — Z79.01: ICD-10-CM

## 2021-06-23 DIAGNOSIS — L84: ICD-10-CM

## 2021-06-23 DIAGNOSIS — L97.821: Primary | ICD-10-CM

## 2021-07-14 ENCOUNTER — HOSPITAL ENCOUNTER (OUTPATIENT)
Dept: HOSPITAL 35 - HYPER | Age: 81
End: 2021-07-14
Attending: PREVENTIVE MEDICINE
Payer: COMMERCIAL

## 2021-07-14 DIAGNOSIS — L59.8: ICD-10-CM

## 2021-07-14 DIAGNOSIS — I48.19: ICD-10-CM

## 2021-07-14 DIAGNOSIS — I11.0: ICD-10-CM

## 2021-07-14 DIAGNOSIS — L97.821: Primary | ICD-10-CM

## 2021-07-14 DIAGNOSIS — E78.5: ICD-10-CM

## 2021-07-14 DIAGNOSIS — K21.9: ICD-10-CM

## 2021-07-14 DIAGNOSIS — L97.811: ICD-10-CM

## 2021-07-14 DIAGNOSIS — Z79.01: ICD-10-CM

## 2021-07-14 DIAGNOSIS — L30.9: ICD-10-CM

## 2021-07-14 DIAGNOSIS — I50.32: ICD-10-CM

## 2021-07-14 DIAGNOSIS — L84: ICD-10-CM

## 2021-07-14 DIAGNOSIS — Z86.12: ICD-10-CM

## 2021-07-14 DIAGNOSIS — Q82.0: ICD-10-CM

## 2021-07-14 DIAGNOSIS — M19.90: ICD-10-CM

## 2021-08-02 ENCOUNTER — HOSPITAL ENCOUNTER (OUTPATIENT)
Dept: HOSPITAL 35 - SJCVC | Age: 81
End: 2021-08-02
Attending: INTERNAL MEDICINE
Payer: COMMERCIAL

## 2021-08-02 DIAGNOSIS — I48.21: ICD-10-CM

## 2021-08-02 DIAGNOSIS — I50.32: ICD-10-CM

## 2021-08-02 DIAGNOSIS — Z88.8: ICD-10-CM

## 2021-08-02 DIAGNOSIS — Z88.2: ICD-10-CM

## 2021-08-02 DIAGNOSIS — Z90.710: ICD-10-CM

## 2021-08-02 DIAGNOSIS — K21.9: ICD-10-CM

## 2021-08-02 DIAGNOSIS — Z79.899: ICD-10-CM

## 2021-08-02 DIAGNOSIS — I89.0: ICD-10-CM

## 2021-08-02 DIAGNOSIS — M19.90: ICD-10-CM

## 2021-08-02 DIAGNOSIS — Z82.49: ICD-10-CM

## 2021-08-02 DIAGNOSIS — R94.31: Primary | ICD-10-CM

## 2021-08-02 DIAGNOSIS — Z88.0: ICD-10-CM

## 2021-08-02 DIAGNOSIS — E78.5: ICD-10-CM

## 2021-08-02 DIAGNOSIS — I11.0: ICD-10-CM

## 2021-08-04 ENCOUNTER — HOSPITAL ENCOUNTER (OUTPATIENT)
Dept: HOSPITAL 35 - HYPER | Age: 81
End: 2021-08-04
Attending: PREVENTIVE MEDICINE
Payer: COMMERCIAL

## 2021-08-04 DIAGNOSIS — Z86.12: ICD-10-CM

## 2021-08-04 DIAGNOSIS — L97.811: ICD-10-CM

## 2021-08-04 DIAGNOSIS — L30.9: ICD-10-CM

## 2021-08-04 DIAGNOSIS — Z79.01: ICD-10-CM

## 2021-08-04 DIAGNOSIS — E78.5: ICD-10-CM

## 2021-08-04 DIAGNOSIS — I50.32: ICD-10-CM

## 2021-08-04 DIAGNOSIS — L59.8: ICD-10-CM

## 2021-08-04 DIAGNOSIS — Q82.0: ICD-10-CM

## 2021-08-04 DIAGNOSIS — K21.9: ICD-10-CM

## 2021-08-04 DIAGNOSIS — I11.0: ICD-10-CM

## 2021-08-04 DIAGNOSIS — L97.821: Primary | ICD-10-CM

## 2021-08-04 DIAGNOSIS — L84: ICD-10-CM

## 2021-08-04 DIAGNOSIS — I48.19: ICD-10-CM

## 2021-08-04 DIAGNOSIS — L89.892: ICD-10-CM

## 2021-08-04 DIAGNOSIS — M19.90: ICD-10-CM

## 2021-08-25 ENCOUNTER — HOSPITAL ENCOUNTER (OUTPATIENT)
Dept: HOSPITAL 35 - HYPER | Age: 81
End: 2021-08-25
Attending: PREVENTIVE MEDICINE
Payer: COMMERCIAL

## 2021-08-25 DIAGNOSIS — M19.90: ICD-10-CM

## 2021-08-25 DIAGNOSIS — Z79.01: ICD-10-CM

## 2021-08-25 DIAGNOSIS — L84: ICD-10-CM

## 2021-08-25 DIAGNOSIS — I48.19: ICD-10-CM

## 2021-08-25 DIAGNOSIS — E78.5: ICD-10-CM

## 2021-08-25 DIAGNOSIS — Q82.0: ICD-10-CM

## 2021-08-25 DIAGNOSIS — L30.9: ICD-10-CM

## 2021-08-25 DIAGNOSIS — I11.0: ICD-10-CM

## 2021-08-25 DIAGNOSIS — I50.32: ICD-10-CM

## 2021-08-25 DIAGNOSIS — L89.892: Primary | ICD-10-CM

## 2021-08-25 DIAGNOSIS — K21.9: ICD-10-CM

## 2021-08-25 DIAGNOSIS — Z86.12: ICD-10-CM

## 2021-08-25 DIAGNOSIS — L97.821: ICD-10-CM

## 2021-08-25 DIAGNOSIS — L59.8: ICD-10-CM

## 2021-08-25 DIAGNOSIS — L97.811: ICD-10-CM

## 2021-09-08 ENCOUNTER — HOSPITAL ENCOUNTER (OUTPATIENT)
Dept: HOSPITAL 35 - HYPER | Age: 81
End: 2021-09-08
Attending: PREVENTIVE MEDICINE
Payer: COMMERCIAL

## 2021-09-08 DIAGNOSIS — L89.892: Primary | ICD-10-CM

## 2021-09-08 DIAGNOSIS — I48.19: ICD-10-CM

## 2021-09-08 DIAGNOSIS — E78.5: ICD-10-CM

## 2021-09-08 DIAGNOSIS — L97.821: ICD-10-CM

## 2021-09-08 DIAGNOSIS — L29.8: ICD-10-CM

## 2021-09-08 DIAGNOSIS — M19.90: ICD-10-CM

## 2021-09-08 DIAGNOSIS — K21.9: ICD-10-CM

## 2021-09-08 DIAGNOSIS — L30.9: ICD-10-CM

## 2021-09-08 DIAGNOSIS — Z79.01: ICD-10-CM

## 2021-09-08 DIAGNOSIS — Q82.0: ICD-10-CM

## 2021-09-08 DIAGNOSIS — Z86.12: ICD-10-CM

## 2021-09-08 DIAGNOSIS — L84: ICD-10-CM

## 2021-09-08 DIAGNOSIS — L97.811: ICD-10-CM

## 2021-09-08 DIAGNOSIS — I50.32: ICD-10-CM

## 2021-09-08 DIAGNOSIS — I11.0: ICD-10-CM

## 2021-09-29 ENCOUNTER — HOSPITAL ENCOUNTER (OUTPATIENT)
Dept: HOSPITAL 35 - HYPER | Age: 81
End: 2021-09-29
Attending: PREVENTIVE MEDICINE
Payer: COMMERCIAL

## 2021-09-29 DIAGNOSIS — K21.9: ICD-10-CM

## 2021-09-29 DIAGNOSIS — L30.9: ICD-10-CM

## 2021-09-29 DIAGNOSIS — I50.32: ICD-10-CM

## 2021-09-29 DIAGNOSIS — L89.892: Primary | ICD-10-CM

## 2021-09-29 DIAGNOSIS — Z79.01: ICD-10-CM

## 2021-09-29 DIAGNOSIS — L97.821: ICD-10-CM

## 2021-09-29 DIAGNOSIS — L97.811: ICD-10-CM

## 2021-09-29 DIAGNOSIS — M19.90: ICD-10-CM

## 2021-09-29 DIAGNOSIS — I11.0: ICD-10-CM

## 2021-09-29 DIAGNOSIS — I48.19: ICD-10-CM

## 2021-09-29 DIAGNOSIS — Z86.12: ICD-10-CM

## 2021-09-29 DIAGNOSIS — L84: ICD-10-CM

## 2021-09-29 DIAGNOSIS — E78.5: ICD-10-CM

## 2021-09-29 DIAGNOSIS — Q82.0: ICD-10-CM

## 2021-09-29 DIAGNOSIS — L29.8: ICD-10-CM

## 2021-10-20 ENCOUNTER — HOSPITAL ENCOUNTER (OUTPATIENT)
Dept: HOSPITAL 35 - HYPER | Age: 81
End: 2021-10-20
Attending: PREVENTIVE MEDICINE
Payer: COMMERCIAL

## 2021-10-20 DIAGNOSIS — M19.90: ICD-10-CM

## 2021-10-20 DIAGNOSIS — Z79.01: ICD-10-CM

## 2021-10-20 DIAGNOSIS — Z86.12: ICD-10-CM

## 2021-10-20 DIAGNOSIS — I48.19: ICD-10-CM

## 2021-10-20 DIAGNOSIS — K21.9: ICD-10-CM

## 2021-10-20 DIAGNOSIS — I50.32: ICD-10-CM

## 2021-10-20 DIAGNOSIS — L97.812: ICD-10-CM

## 2021-10-20 DIAGNOSIS — I11.0: ICD-10-CM

## 2021-10-20 DIAGNOSIS — L97.822: ICD-10-CM

## 2021-10-20 DIAGNOSIS — L30.9: ICD-10-CM

## 2021-10-20 DIAGNOSIS — L29.8: ICD-10-CM

## 2021-10-20 DIAGNOSIS — L84: ICD-10-CM

## 2021-10-20 DIAGNOSIS — L89.892: Primary | ICD-10-CM

## 2021-10-20 DIAGNOSIS — Q82.0: ICD-10-CM

## 2021-10-20 DIAGNOSIS — E78.5: ICD-10-CM

## 2021-11-10 ENCOUNTER — HOSPITAL ENCOUNTER (OUTPATIENT)
Dept: HOSPITAL 35 - HYPER | Age: 81
End: 2021-11-10
Attending: PREVENTIVE MEDICINE
Payer: COMMERCIAL

## 2021-11-10 DIAGNOSIS — E78.5: ICD-10-CM

## 2021-11-10 DIAGNOSIS — I50.32: ICD-10-CM

## 2021-11-10 DIAGNOSIS — I48.19: ICD-10-CM

## 2021-11-10 DIAGNOSIS — I89.0: ICD-10-CM

## 2021-11-10 DIAGNOSIS — L30.9: ICD-10-CM

## 2021-11-10 DIAGNOSIS — L29.8: ICD-10-CM

## 2021-11-10 DIAGNOSIS — I11.0: ICD-10-CM

## 2021-11-10 DIAGNOSIS — R60.9: ICD-10-CM

## 2021-11-10 DIAGNOSIS — L84: ICD-10-CM

## 2021-11-10 DIAGNOSIS — Z86.12: ICD-10-CM

## 2021-11-10 DIAGNOSIS — L97.811: ICD-10-CM

## 2021-11-10 DIAGNOSIS — K21.9: ICD-10-CM

## 2021-11-10 DIAGNOSIS — M19.90: ICD-10-CM

## 2021-11-10 DIAGNOSIS — Z79.899: ICD-10-CM

## 2021-11-10 DIAGNOSIS — L97.821: ICD-10-CM

## 2021-11-10 DIAGNOSIS — L89.892: Primary | ICD-10-CM

## 2021-11-10 DIAGNOSIS — Z79.01: ICD-10-CM

## 2021-12-15 ENCOUNTER — HOSPITAL ENCOUNTER (OUTPATIENT)
Dept: HOSPITAL 35 - HYPER | Age: 81
End: 2021-12-15
Attending: PREVENTIVE MEDICINE
Payer: COMMERCIAL

## 2021-12-15 DIAGNOSIS — Q82.0: ICD-10-CM

## 2021-12-15 DIAGNOSIS — Z79.899: ICD-10-CM

## 2021-12-15 DIAGNOSIS — L89.892: Primary | ICD-10-CM

## 2021-12-15 DIAGNOSIS — I48.0: ICD-10-CM

## 2021-12-15 DIAGNOSIS — L97.822: ICD-10-CM

## 2021-12-15 DIAGNOSIS — I50.32: ICD-10-CM

## 2021-12-15 DIAGNOSIS — I11.0: ICD-10-CM

## 2021-12-15 DIAGNOSIS — M19.90: ICD-10-CM

## 2021-12-15 DIAGNOSIS — K21.9: ICD-10-CM

## 2021-12-15 DIAGNOSIS — L84: ICD-10-CM

## 2021-12-15 DIAGNOSIS — I48.19: ICD-10-CM

## 2021-12-15 DIAGNOSIS — L97.811: ICD-10-CM

## 2021-12-15 DIAGNOSIS — L30.9: ICD-10-CM

## 2021-12-15 DIAGNOSIS — Z79.01: ICD-10-CM

## 2021-12-15 DIAGNOSIS — L29.8: ICD-10-CM

## 2021-12-15 DIAGNOSIS — E78.5: ICD-10-CM

## 2021-12-15 DIAGNOSIS — Z86.12: ICD-10-CM

## 2022-01-05 ENCOUNTER — HOSPITAL ENCOUNTER (OUTPATIENT)
Dept: HOSPITAL 35 - HYPER | Age: 82
End: 2022-01-05
Attending: PREVENTIVE MEDICINE
Payer: COMMERCIAL

## 2022-01-05 DIAGNOSIS — L84: ICD-10-CM

## 2022-01-05 DIAGNOSIS — I50.32: ICD-10-CM

## 2022-01-05 DIAGNOSIS — I11.0: ICD-10-CM

## 2022-01-05 DIAGNOSIS — M19.90: ICD-10-CM

## 2022-01-05 DIAGNOSIS — L97.811: ICD-10-CM

## 2022-01-05 DIAGNOSIS — L30.9: ICD-10-CM

## 2022-01-05 DIAGNOSIS — E78.5: ICD-10-CM

## 2022-01-05 DIAGNOSIS — L97.822: ICD-10-CM

## 2022-01-05 DIAGNOSIS — R60.9: ICD-10-CM

## 2022-01-05 DIAGNOSIS — Z79.899: ICD-10-CM

## 2022-01-05 DIAGNOSIS — Z86.12: ICD-10-CM

## 2022-01-05 DIAGNOSIS — K21.9: ICD-10-CM

## 2022-01-05 DIAGNOSIS — I89.0: ICD-10-CM

## 2022-01-05 DIAGNOSIS — Z79.01: ICD-10-CM

## 2022-01-05 DIAGNOSIS — L89.892: Primary | ICD-10-CM

## 2022-01-05 DIAGNOSIS — L29.8: ICD-10-CM

## 2022-01-05 DIAGNOSIS — I48.19: ICD-10-CM

## 2022-01-26 ENCOUNTER — HOSPITAL ENCOUNTER (OUTPATIENT)
Dept: HOSPITAL 35 - HYPER | Age: 82
End: 2022-01-26
Attending: PREVENTIVE MEDICINE
Payer: COMMERCIAL

## 2022-01-26 DIAGNOSIS — L97.821: ICD-10-CM

## 2022-01-26 DIAGNOSIS — L29.8: ICD-10-CM

## 2022-01-26 DIAGNOSIS — R60.9: ICD-10-CM

## 2022-01-26 DIAGNOSIS — L97.811: ICD-10-CM

## 2022-01-26 DIAGNOSIS — S90.822D: ICD-10-CM

## 2022-01-26 DIAGNOSIS — I11.0: ICD-10-CM

## 2022-01-26 DIAGNOSIS — Z79.01: ICD-10-CM

## 2022-01-26 DIAGNOSIS — Z86.12: ICD-10-CM

## 2022-01-26 DIAGNOSIS — E78.5: ICD-10-CM

## 2022-01-26 DIAGNOSIS — L89.892: Primary | ICD-10-CM

## 2022-01-26 DIAGNOSIS — I89.0: ICD-10-CM

## 2022-01-26 DIAGNOSIS — L84: ICD-10-CM

## 2022-01-26 DIAGNOSIS — Z79.899: ICD-10-CM

## 2022-01-26 DIAGNOSIS — M19.90: ICD-10-CM

## 2022-01-26 DIAGNOSIS — I48.19: ICD-10-CM

## 2022-01-26 DIAGNOSIS — K21.9: ICD-10-CM

## 2022-01-26 DIAGNOSIS — I50.32: ICD-10-CM

## 2022-01-26 DIAGNOSIS — I48.0: ICD-10-CM
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
## 2022-01-26 DIAGNOSIS — L30.9: ICD-10-CM

## 2022-02-08 ENCOUNTER — HOSPITAL ENCOUNTER (OUTPATIENT)
Dept: HOSPITAL 35 - SJCVC | Age: 82
End: 2022-02-08
Attending: INTERNAL MEDICINE
Payer: COMMERCIAL

## 2022-02-08 DIAGNOSIS — Z88.0: ICD-10-CM

## 2022-02-08 DIAGNOSIS — I48.19: ICD-10-CM

## 2022-02-08 DIAGNOSIS — R94.31: Primary | ICD-10-CM

## 2022-02-08 DIAGNOSIS — Z79.899: ICD-10-CM

## 2022-02-08 DIAGNOSIS — Z82.49: ICD-10-CM

## 2022-02-08 DIAGNOSIS — K21.9: ICD-10-CM

## 2022-02-08 DIAGNOSIS — I50.32: ICD-10-CM

## 2022-02-08 DIAGNOSIS — I11.0: ICD-10-CM

## 2022-02-08 DIAGNOSIS — I89.0: ICD-10-CM

## 2022-02-08 DIAGNOSIS — Z88.8: ICD-10-CM

## 2022-02-08 DIAGNOSIS — E78.5: ICD-10-CM

## 2022-02-08 DIAGNOSIS — Z88.2: ICD-10-CM

## 2022-02-08 DIAGNOSIS — Z72.89: ICD-10-CM

## 2022-02-15 ENCOUNTER — HOSPITAL ENCOUNTER (OUTPATIENT)
Dept: HOSPITAL 35 - HYPER | Age: 82
End: 2022-02-15
Attending: PREVENTIVE MEDICINE
Payer: COMMERCIAL

## 2022-02-15 DIAGNOSIS — M19.90: ICD-10-CM

## 2022-02-15 DIAGNOSIS — E78.5: ICD-10-CM

## 2022-02-15 DIAGNOSIS — I89.0: ICD-10-CM

## 2022-02-15 DIAGNOSIS — R60.9: ICD-10-CM

## 2022-02-15 DIAGNOSIS — I50.32: ICD-10-CM

## 2022-02-15 DIAGNOSIS — L30.9: ICD-10-CM

## 2022-02-15 DIAGNOSIS — L84: ICD-10-CM

## 2022-02-15 DIAGNOSIS — L29.8: ICD-10-CM

## 2022-02-15 DIAGNOSIS — K21.9: ICD-10-CM

## 2022-02-15 DIAGNOSIS — L89.892: Primary | ICD-10-CM

## 2022-02-15 DIAGNOSIS — L97.811: ICD-10-CM

## 2022-02-15 DIAGNOSIS — I48.0: ICD-10-CM

## 2022-02-15 DIAGNOSIS — I48.19: ICD-10-CM

## 2022-02-15 DIAGNOSIS — Z79.01: ICD-10-CM

## 2022-02-15 DIAGNOSIS — L97.821: ICD-10-CM

## 2022-02-15 DIAGNOSIS — I11.0: ICD-10-CM

## 2022-02-15 DIAGNOSIS — Z86.12: ICD-10-CM

## 2022-02-22 ENCOUNTER — HOSPITAL ENCOUNTER (OUTPATIENT)
Dept: HOSPITAL 35 - HYPER | Age: 82
End: 2022-02-22
Attending: PREVENTIVE MEDICINE
Payer: COMMERCIAL

## 2022-02-22 DIAGNOSIS — K21.9: ICD-10-CM

## 2022-02-22 DIAGNOSIS — Z79.01: ICD-10-CM

## 2022-02-22 DIAGNOSIS — M19.90: ICD-10-CM

## 2022-02-22 DIAGNOSIS — L30.9: ICD-10-CM

## 2022-02-22 DIAGNOSIS — I48.0: ICD-10-CM

## 2022-02-22 DIAGNOSIS — E78.5: ICD-10-CM

## 2022-02-22 DIAGNOSIS — L84: ICD-10-CM

## 2022-02-22 DIAGNOSIS — R60.9: ICD-10-CM

## 2022-02-22 DIAGNOSIS — Z86.12: ICD-10-CM

## 2022-02-22 DIAGNOSIS — I89.0: ICD-10-CM

## 2022-02-22 DIAGNOSIS — L29.8: ICD-10-CM

## 2022-02-22 DIAGNOSIS — L89.892: Primary | ICD-10-CM

## 2022-02-22 DIAGNOSIS — I48.19: ICD-10-CM

## 2022-02-22 DIAGNOSIS — L97.821: ICD-10-CM

## 2022-02-22 DIAGNOSIS — I50.32: ICD-10-CM

## 2022-02-22 DIAGNOSIS — I11.0: ICD-10-CM

## 2022-02-22 DIAGNOSIS — L97.811: ICD-10-CM
